# Patient Record
Sex: MALE | URBAN - METROPOLITAN AREA
[De-identification: names, ages, dates, MRNs, and addresses within clinical notes are randomized per-mention and may not be internally consistent; named-entity substitution may affect disease eponyms.]

---

## 2019-07-03 ENCOUNTER — TELEPHONE (OUTPATIENT)
Dept: PEDIATRICS CLINIC | Age: 19
End: 2019-07-03

## 2019-07-03 NOTE — TELEPHONE ENCOUNTER
Mom called in requesting a signed immunization record for college. Records printed and awaiting signature.  Will return call to mom once ready for

## 2021-12-30 ENCOUNTER — TELEPHONE (OUTPATIENT)
Dept: PEDIATRICS CLINIC | Age: 21
End: 2021-12-30

## 2021-12-30 NOTE — TELEPHONE ENCOUNTER
Spoke with mother:    Advised patients would need to be seen. Mother states that she is the one with scabies and wanted for treatment for them. Advised unable to treat as patient has not been seen since 2014 and would need to see an adult physican.     Mother was unhappy with outcome

## 2021-12-30 NOTE — TELEPHONE ENCOUNTER
----- Message from Agustina Jacobson sent at 12/30/2021  9:26 AM EST -----  Subject: Message to Provider    QUESTIONS  Information for Provider? Was in close contact with a family member that   has scabies and needs to be treated for it, they were told to do so to be   safe. Leeanne 20 [phone] - [address]  ---------------------------------------------------------------------------  --------------  CALL BACK INFO  What is the best way for the office to contact you? OK to leave message on   voicemail  Preferred Call Back Phone Number? [phone]  ---------------------------------------------------------------------------  --------------  SCRIPT ANSWERS  Relationship to Patient? Third Party  Representative Name?  Bennett Erm